# Patient Record
Sex: MALE | Race: ASIAN | NOT HISPANIC OR LATINO | URBAN - METROPOLITAN AREA
[De-identification: names, ages, dates, MRNs, and addresses within clinical notes are randomized per-mention and may not be internally consistent; named-entity substitution may affect disease eponyms.]

---

## 2021-06-29 ENCOUNTER — TELEPHONE ENCOUNTER (OUTPATIENT)
Dept: URBAN - METROPOLITAN AREA CLINIC 35 | Facility: CLINIC | Age: 46
End: 2021-06-29

## 2021-06-29 ENCOUNTER — OFFICE VISIT (OUTPATIENT)
Dept: URBAN - METROPOLITAN AREA CLINIC 35 | Facility: CLINIC | Age: 46
End: 2021-06-29

## 2021-06-29 VITALS — OXYGEN SATURATION: 97 % | WEIGHT: 153 LBS | HEART RATE: 80 BPM | HEIGHT: 67 IN | BODY MASS INDEX: 24.01 KG/M2

## 2021-06-29 PROBLEM — 197321007: Status: ACTIVE | Noted: 2021-06-29

## 2021-06-29 PROBLEM — 305058001: Status: ACTIVE | Noted: 2021-06-29

## 2021-06-29 RX ORDER — MULTIVITAMIN
1 TABLET TABLET ORAL ONCE A DAY
Qty: 30 | Status: ACTIVE | COMMUNITY

## 2021-06-29 RX ORDER — ICOSAPENT ETHYL 1000 MG/1
2 CAPSULES WITH MEALS CAPSULE ORAL TWICE A DAY
Qty: 120 | Status: ACTIVE | COMMUNITY

## 2021-06-29 RX ORDER — METRONIDAZOLE 500 MG/1
1 TABLET TABLET ORAL THREE TIMES A DAY
Qty: 30 | Status: ON HOLD | COMMUNITY

## 2021-06-29 RX ORDER — CIPROFLOXACIN 500 MG/5ML
2.5 ML KIT ORAL
Qty: 15 | Status: ON HOLD | COMMUNITY

## 2021-06-29 RX ORDER — ROSUVASTATIN CALCIUM 20 MG/1
1 TABLET TABLET, FILM COATED ORAL ONCE A DAY
Qty: 30 | Status: ON HOLD | COMMUNITY

## 2021-06-29 RX ORDER — ERGOCALCIFEROL 1.25 MG/1
1 CAPSULE CAPSULE ORAL
Qty: 4 | Status: ON HOLD | COMMUNITY

## 2021-06-29 RX ORDER — POLYETHYLENE GLYCOL 3350, SODIUM SULFATE, SODIUM CHLORIDE, POTASSIUM CHLORIDE, ASCORBIC ACID, SODIUM ASCORBATE 140-9-5.2G
500 ML KIT ORAL
Qty: 1 KIT | Refills: 0 | OUTPATIENT
Start: 2021-06-29

## 2021-06-29 RX ORDER — ROSUVASTATIN CALCIUM 20 MG/1
1 TABLET TABLET, FILM COATED ORAL ONCE A DAY
Qty: 30 | Status: ACTIVE | COMMUNITY

## 2021-06-29 NOTE — HPI-MIGRATED HPI
;   ;   ;     Diverticulitis : 45 year old male who presents today at the request of PCP Dr. Zhang for a consultation for diverticulitis.  He was found to have uncomplicated diverticulitis via CT Abdomen/Pelvis with contrast  (4/30/2021) performed at [facility]. Patient admits to having blood work completed tuesday of last week at [facility]. Showing elevated liver enzymes GGT (130) H.    Previous CT Abdomen/Pelvis with contrast performed (9/22/2020) revealed mild right pelvocaliectasis with right periureteric and perinephric fat stranding.  A small stone is present in the urinary bladder, likely recently passed.  Additional nonobstructive bilateral renal stones are present.  Additional incidental findings as above.   Patient has also mentioned that the last two kidney stones episodes he had gone to [facility] where an injection was given for pain by his primary Dr. Samantha Peraza.;   Colorectal Cancer Screening : 45 year old male patient presents today for a consultation for a colorectal cancer screening. Patient admits this is his first colonoscopy. He denies a family hx of colon, gastric, or esophageal cancer/polyps. Patient currently admits 1-2 bowel movement per day.  Stools are formed.  Patient admits symptoms of heartburn/acid reflux occasionally.   Patient states that he has never had a Colonoscopy/ EGD done in the past.;   Elevated Liver Enzymes : Patient presents today for consultation about recent elevated liver enzymes.  Patient denies a history of elevated liver enzymes.  Patient currently denies jaundice, chills, RUQ pains, dizziness, easy bruising, fatigue.   RISK FACTORS:   Admits- travel outside the . [place] (06/24/2021 thru 06/27/2021).  Denies- Alcohol, drugs, NSAIDs, protein supplements, tattoos, piercings,  service, blood transfusion, family history of liver disease or cancer, personal exposure to liver diseases, senior care, rehab  ;

## 2021-08-03 ENCOUNTER — TELEPHONE ENCOUNTER (OUTPATIENT)
Dept: URBAN - METROPOLITAN AREA CLINIC 35 | Facility: CLINIC | Age: 46
End: 2021-08-03

## 2021-08-04 ENCOUNTER — OFFICE VISIT (OUTPATIENT)
Dept: URBAN - METROPOLITAN AREA SURGERY CENTER 8 | Facility: SURGERY CENTER | Age: 46
End: 2021-08-04

## 2021-08-17 ENCOUNTER — OFFICE VISIT (OUTPATIENT)
Dept: URBAN - METROPOLITAN AREA CLINIC 35 | Facility: CLINIC | Age: 46
End: 2021-08-17

## 2021-08-17 VITALS
HEART RATE: 79 BPM | BODY MASS INDEX: 23.07 KG/M2 | SYSTOLIC BLOOD PRESSURE: 110 MMHG | WEIGHT: 147 LBS | DIASTOLIC BLOOD PRESSURE: 72 MMHG | OXYGEN SATURATION: 98 % | HEIGHT: 67 IN

## 2021-08-17 PROBLEM — 398050005 DIVERTICULAR DISEASE OF COLON: Status: ACTIVE | Noted: 2021-08-17

## 2021-08-17 RX ORDER — ICOSAPENT ETHYL 1000 MG/1
2 CAPSULES WITH MEALS CAPSULE ORAL TWICE A DAY
Qty: 120 | Status: ACTIVE | COMMUNITY

## 2021-08-17 RX ORDER — CIPROFLOXACIN 500 MG/5ML
2.5 ML KIT ORAL
Qty: 15 | Status: ON HOLD | COMMUNITY

## 2021-08-17 RX ORDER — ROSUVASTATIN CALCIUM 20 MG/1
1 TABLET TABLET, FILM COATED ORAL ONCE A DAY
Qty: 30 | Status: ACTIVE | COMMUNITY

## 2021-08-17 RX ORDER — POLYETHYLENE GLYCOL 3350, SODIUM SULFATE, SODIUM CHLORIDE, POTASSIUM CHLORIDE, ASCORBIC ACID, SODIUM ASCORBATE 140-9-5.2G
500 ML KIT ORAL
Qty: 1 KIT | Refills: 0 | Status: ON HOLD | COMMUNITY
Start: 2021-06-29

## 2021-08-17 RX ORDER — ROSUVASTATIN CALCIUM 20 MG/1
1 TABLET TABLET, FILM COATED ORAL ONCE A DAY
Qty: 30 | Status: ON HOLD | COMMUNITY

## 2021-08-17 RX ORDER — MULTIVITAMIN
1 TABLET TABLET ORAL ONCE A DAY
Qty: 30 | Status: ACTIVE | COMMUNITY

## 2021-08-17 RX ORDER — METRONIDAZOLE 500 MG/1
1 TABLET TABLET ORAL THREE TIMES A DAY
Qty: 30 | Status: ON HOLD | COMMUNITY

## 2021-08-17 RX ORDER — ERGOCALCIFEROL 1.25 MG/1
1 CAPSULE CAPSULE ORAL
Qty: 4 | Status: ON HOLD | COMMUNITY

## 2021-08-17 NOTE — HPI-MIGRATED HPI
;   ;     Diverticulitis : Patient presents today after his colonoscopy and follow up of diverticulitis. He denies any complications following his procedure.  Patient denies abdominal pain.  He reports bowel movements have recently become strenous.  Currently admits 1 strenous bowel movement per day.  Stools are formed without blood or mucus present.  He admits the use of Metamucil daily.   Patient denies any symptoms of heartburn/acid reflux occasionally.  Last visit (6/29/2021) 45 year old male who presents today at the request of PCP Dr. Zhang for a consultation for diverticulitis.  He was found to have uncomplicated diverticulitis via CT Abdomen/Pelvis with contrast  (4/30/2021) performed at [facility]. Patient admits to having blood work completed Tuesday of last week at [facility]. Showing elevated liver enzymes GGT (130) H.    Previous CT Abdomen/Pelvis with contrast performed (9/22/2020) revealed mild right pelvocaliectasis with right periureteric and perinephric fat stranding.  A small stone is present in the urinary bladder, likely recently passed.  Additional nonobstructive bilateral renal stones are present.  Additional incidental findings as above.   Patient has also mentioned that the last two kidney stones episodes he had gone to [facility] where an injection was given for pain by his primary Dr. Samantha Peraza.;   Elevated Liver Enzymes : Patient continue to deny jaundice, chills, RUQ pains, dizziness, easy bruising, or fatigue.   He denies completing any labs since his last visit.    Last visit (6/29/2021)Patient presents today for consultation about recent elevated liver enzymes.  Patient denies a history of elevated liver enzymes.  Patient currently denies jaundice, chills, RUQ pains, dizziness, easy bruising, fatigue.   RISK FACTORS:   Admits- travel outside the [place]. [place] (06/24/2021 thru 06/27/2021).  Denies- Alcohol, drugs, NSAIDs, protein supplements, tattoos, piercings,  service, blood transfusion, family history of liver disease or cancer, personal exposure to liver diseases, group home, rehab;

## 2021-08-22 ENCOUNTER — DASHBOARD ENCOUNTERS (OUTPATIENT)
Age: 46
End: 2021-08-22